# Patient Record
Sex: MALE | Race: WHITE
[De-identification: names, ages, dates, MRNs, and addresses within clinical notes are randomized per-mention and may not be internally consistent; named-entity substitution may affect disease eponyms.]

---

## 2022-06-11 ENCOUNTER — HOSPITAL ENCOUNTER (EMERGENCY)
Dept: HOSPITAL 54 - ER | Age: 27
Discharge: HOME | End: 2022-06-11
Payer: MEDICAID

## 2022-06-11 VITALS — HEIGHT: 62 IN | BODY MASS INDEX: 47.84 KG/M2 | WEIGHT: 260 LBS

## 2022-06-11 VITALS — SYSTOLIC BLOOD PRESSURE: 158 MMHG | DIASTOLIC BLOOD PRESSURE: 90 MMHG

## 2022-06-11 DIAGNOSIS — F19.10: Primary | ICD-10-CM

## 2022-06-11 DIAGNOSIS — R45.2: ICD-10-CM

## 2022-06-11 LAB
ALBUMIN SERPL BCP-MCNC: 4.1 G/DL (ref 3.4–5)
ALP SERPL-CCNC: 101 U/L (ref 46–116)
ALT SERPL W P-5'-P-CCNC: 42 U/L (ref 12–78)
APAP SERPL-MCNC: < 0 UG/ML (ref 10–30)
AST SERPL W P-5'-P-CCNC: 89 U/L (ref 15–37)
BASOPHILS # BLD AUTO: 0.1 K/UL (ref 0–0.2)
BASOPHILS NFR BLD AUTO: 0.7 % (ref 0–2)
BILIRUB DIRECT SERPL-MCNC: 0.2 MG/DL (ref 0–0.2)
BILIRUB SERPL-MCNC: 1 MG/DL (ref 0.2–1)
BILIRUB UR QL STRIP: NEGATIVE
BUN SERPL-MCNC: 7 MG/DL (ref 7–18)
CALCIUM SERPL-MCNC: 8.5 MG/DL (ref 8.5–10.1)
CHLORIDE SERPL-SCNC: 107 MMOL/L (ref 98–107)
CO2 SERPL-SCNC: 24 MMOL/L (ref 21–32)
COLOR UR: YELLOW
CREAT SERPL-MCNC: 1 MG/DL (ref 0.6–1.3)
EOSINOPHIL NFR BLD AUTO: 0.4 % (ref 0–6)
ETHANOL SERPL-MCNC: < 3 MG/DL (ref 0–0)
GLUCOSE SERPL-MCNC: 106 MG/DL (ref 74–106)
GLUCOSE UR STRIP-MCNC: NEGATIVE MG/DL
HCT VFR BLD AUTO: 44 % (ref 39–51)
HGB BLD-MCNC: 14.9 G/DL (ref 13.5–17.5)
LEUKOCYTE ESTERASE UR QL STRIP: NEGATIVE
LYMPHOCYTES NFR BLD AUTO: 18.1 % (ref 20–44)
LYMPHOCYTES NFR BLD AUTO: 2 K/UL (ref 0.8–4.8)
MCHC RBC AUTO-ENTMCNC: 34 G/DL (ref 31–36)
MCV RBC AUTO: 84 FL (ref 80–96)
MONOCYTES NFR BLD AUTO: 1.3 K/UL (ref 0.1–1.3)
MONOCYTES NFR BLD AUTO: 11.6 % (ref 2–12)
NEUTROPHILS # BLD AUTO: 7.5 K/UL (ref 1.8–8.9)
NEUTROPHILS NFR BLD AUTO: 69.2 % (ref 43–81)
NITRITE UR QL STRIP: NEGATIVE
PH UR STRIP: 6 [PH] (ref 5–8)
PLATELET # BLD AUTO: 234 K/UL (ref 150–450)
POTASSIUM SERPL-SCNC: 3.4 MMOL/L (ref 3.5–5.1)
PROT SERPL-MCNC: 7 G/DL (ref 6.4–8.2)
PROT UR QL STRIP: (no result) MG/DL
RBC # BLD AUTO: 5.25 MIL/UL (ref 4.5–6)
SODIUM SERPL-SCNC: 140 MMOL/L (ref 136–145)
UROBILINOGEN UR STRIP-MCNC: 1 EU/DL
WBC NRBC COR # BLD AUTO: 10.8 K/UL (ref 4.3–11)

## 2022-06-11 PROCEDURE — G0480 DRUG TEST DEF 1-7 CLASSES: HCPCS

## 2022-06-11 NOTE — NUR
-------------------------------------------------------------------------------

           *** Note undone in ED - 06/11/22 at 1241 by DELTA ***            

-------------------------------------------------------------------------------

"Feels Better" Grandson at bedside ( Al) Patient discharged to home in stable 
condition. Written and verbal after care instructions given. Both Patient and 
Family verbalizes understanding of instruction.

## 2022-06-11 NOTE — NUR
PAGED CRISIS CLINICIAN, GLENYS,  REGARDING PT STATUS AND WAS NOTIFIED THAT SHE 
WILL EVALUATE THE PT WITH AN ETA OF 1400

## 2022-06-11 NOTE — NUR
BIBA  "Been doing drugs last couple days NOT able to sleep Now feel weak". 
The patient is alert and oriented x4. In room air and denies SOB. Respiration 
regular and unlabored. Will continue to monitor the patient.

## 2022-06-11 NOTE — NUR
XIN Vargas spoke to Dr Gay. Medically Cleared. Not meeting criteria for 
5150- For discharge Patient discharged to home in stable condition. Written and 
verbal after care instructions given. Patient verbalizes understanding of 
instruction.

## 2022-08-04 ENCOUNTER — HOSPITAL ENCOUNTER (EMERGENCY)
Dept: HOSPITAL 54 - ER | Age: 27
Discharge: HOME | End: 2022-08-04
Payer: MEDICAID

## 2022-08-04 VITALS — DIASTOLIC BLOOD PRESSURE: 70 MMHG | SYSTOLIC BLOOD PRESSURE: 139 MMHG

## 2022-08-04 VITALS — HEIGHT: 65 IN | WEIGHT: 220 LBS | BODY MASS INDEX: 36.65 KG/M2
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
## 2022-08-04 DIAGNOSIS — F15.10: ICD-10-CM
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
## 2022-08-04 DIAGNOSIS — S05.12XA: Primary | ICD-10-CM
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
## 2022-08-04 DIAGNOSIS — F17.200: ICD-10-CM

## 2022-08-04 DIAGNOSIS — T81.30XA: ICD-10-CM
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
